# Patient Record
Sex: MALE | Race: WHITE
[De-identification: names, ages, dates, MRNs, and addresses within clinical notes are randomized per-mention and may not be internally consistent; named-entity substitution may affect disease eponyms.]

---

## 2020-02-02 ENCOUNTER — TRANSCRIPTION ENCOUNTER (OUTPATIENT)
Age: 60
End: 2020-02-02

## 2021-10-03 ENCOUNTER — TRANSCRIPTION ENCOUNTER (OUTPATIENT)
Age: 61
End: 2021-10-03

## 2021-10-06 ENCOUNTER — TRANSCRIPTION ENCOUNTER (OUTPATIENT)
Age: 61
End: 2021-10-06

## 2021-12-07 ENCOUNTER — TRANSCRIPTION ENCOUNTER (OUTPATIENT)
Age: 61
End: 2021-12-07

## 2022-02-11 ENCOUNTER — TRANSCRIPTION ENCOUNTER (OUTPATIENT)
Age: 62
End: 2022-02-11

## 2022-03-26 ENCOUNTER — TRANSCRIPTION ENCOUNTER (OUTPATIENT)
Age: 62
End: 2022-03-26

## 2022-04-12 PROBLEM — Z00.00 ENCOUNTER FOR PREVENTIVE HEALTH EXAMINATION: Status: ACTIVE | Noted: 2022-04-12

## 2022-04-13 ENCOUNTER — APPOINTMENT (OUTPATIENT)
Dept: PAIN MANAGEMENT | Facility: CLINIC | Age: 62
End: 2022-04-13
Payer: COMMERCIAL

## 2022-04-13 VITALS — BODY MASS INDEX: 29.18 KG/M2 | WEIGHT: 197 LBS | HEIGHT: 69 IN

## 2022-04-13 PROCEDURE — 99213 OFFICE O/P EST LOW 20 MIN: CPT

## 2022-04-13 NOTE — HISTORY OF PRESENT ILLNESS
[Lower back] : lower back [4] : 4 [6] : 6 [Tightness] : tightness [Tingling] : tingling [] : yes [Intermittent] : intermittent [Walking/activity] : walking/activity [Injection therapy] : injection therapy [FreeTextEntry1] : 4/13/22- patient had 70% relief.  Now has some tingling in left foot and left thigh cramping.  Will start gabapentin\par \par 3/9/22- Patient here for follow up. He had 90% relief for 1 week. Will schedule repeat injection.\par 1/12/22: Patient presents for initial evaluation. He complains of left sided back and posterior leg pain that started about\par 6 months ago. He has remained very active. no n/t. +weakness. He does a lot of exercises at home. The pain is now\par waking him up at night. Tried the Medrol dosepack with relief while on the pack. currently taking Piroxicam PRN which\par does help.\par Subjective weakness: Yes\par Lower extremity paresthesias:No\par Bladder/bowel dysfunction:No\par Attempted modalities for current complaint:\par See above:\par Medications: Yes\par Injections:No\par Previous Spine Surgery: N/A\par Imaging:\par MRI Lumbar Spine (12/30/21) - Impression:\par 1. L4-5: Disc bulge indents the thecal sac. The bulge causes mild bilateral neuroforaminal stenosis, abutting the exiting\par left L4 nerve.\par 2. L5-S1: 1 cm grade 1-2 anterolisthesis of L5 on S1 with bilateral pars spondylolysis defects. Severe disc space\par narrowing and chronic vertebral endplate changes with mild impaction of the vertebral bodies. Disc bulge with\par uncovering of the posterior disc. No central canal stenosis. Moderate-to-severe bilateral neuroforaminal stenosis\par impinging the exiting L5 nerves. Facet arthropathy. [FreeTextEntry6] : Cramping [FreeTextEntry7] : Left foot  [de-identified] : Getting up in the morning, prolonged sitting

## 2022-04-13 NOTE — ASSESSMENT
[FreeTextEntry1] : The patient is stable on current pain medication with analgesia and without notable side effects or any obvious aberrant behaviors exhibited.  \par There is clinically meaningful improvement in pain and function that outweighs risks to patient safety.  I have discussed risks and realistic benefits of opioid therapy and patient and clinician responsibilities for managing therapy.  Pain agreement has been signed.\par Will renew medication today.\par \par Will start gabapentin\par \par

## 2022-04-13 NOTE — PHYSICAL EXAM
[Normal Coordination] : normal coordination [Normal Mood and Affect] : normal mood and affect [5___] : right hamstring 5[unfilled]/5 [] : positive straight leg raise

## 2022-04-17 ENCOUNTER — TRANSCRIPTION ENCOUNTER (OUTPATIENT)
Age: 62
End: 2022-04-17

## 2022-05-23 ENCOUNTER — NON-APPOINTMENT (OUTPATIENT)
Age: 62
End: 2022-05-23

## 2022-07-05 ENCOUNTER — APPOINTMENT (OUTPATIENT)
Dept: ORTHOPEDIC SURGERY | Facility: CLINIC | Age: 62
End: 2022-07-05

## 2022-07-05 PROCEDURE — J3490M: CUSTOM

## 2022-07-05 PROCEDURE — 99213 OFFICE O/P EST LOW 20 MIN: CPT | Mod: 25

## 2022-07-05 PROCEDURE — 20610 DRAIN/INJ JOINT/BURSA W/O US: CPT

## 2022-07-05 NOTE — HISTORY OF PRESENT ILLNESS
[Right Arm] : right arm [Gradual] : gradual [Sudden] : sudden [7] : 7 [6] : 6 [Burning] : burning [Shooting] : shooting [Stabbing] : stabbing [Intermittent] : intermittent [Exercising] : exercising [de-identified] : 07/05/22:  Patient returns today about 5 1/2 months after right shoulder cortisone injection. Had good relief until 1 week ago, c/o recurring rt shoulder pain. having wake up pain at night. Can't lie on rt side at night. Would like a repeat injection.\par \par  01/21/22: Return visit for a 61 year old male here today for right shoulder pain that began a few months ago. No injury. Lifting overhead is painful and laying on his arm. Had done a MDP recently for his back and Feldene without relief. \par \par PMH: No previous right shoulder pain. [] : Post Surgical Visit: no [FreeTextEntry1] : right shoulder

## 2022-07-05 NOTE — PROCEDURE
[Large Joint Injection] : Large joint injection [Right] : of the right [Subacromial Space] : subacromial space [Pain] : pain [Inflammation] : inflammation [Ethyl Chloride sprayed topically] : ethyl chloride sprayed topically [___ cc    1%] : Lidocaine ~Vcc of 1%  [___ cc    40mg] : Methylprednisolone (Depomedrol) ~Vcc of 40 mg  [] : Patient tolerated procedure well [Call if redness, pain or fever occur] : call if redness, pain or fever occur [Apply ice for 15min out of every hour for the next 12-24 hours as tolerated] : apply ice for 15 minutes out of every hour for the next 12-24 hours as tolerated [Risks, benefits, alternatives discussed / Verbal consent obtained] : the risks benefits, and alternatives have been discussed, and verbal consent was obtained

## 2022-07-05 NOTE — PHYSICAL EXAM
[Normal Mood and Affect] : normal mood and affect [Orientated] : orientated [Able to Communicate] : able to communicate [Well Developed] : well developed [Well Nourished] : well nourished [Right] : right shoulder [NL (0-180)] : full passive forward flexion 0-180 degrees [] : no AC joint tenderness [FreeTextEntry9] : IR right gluteal on the right and T10 on the left.  [de-identified] : +Haylie [de-identified] : external rotation 80 degrees

## 2022-07-14 ENCOUNTER — FORM ENCOUNTER (OUTPATIENT)
Age: 62
End: 2022-07-14

## 2022-07-15 ENCOUNTER — APPOINTMENT (OUTPATIENT)
Dept: MRI IMAGING | Facility: CLINIC | Age: 62
End: 2022-07-15

## 2022-07-15 PROCEDURE — 73221 MRI JOINT UPR EXTREM W/O DYE: CPT | Mod: RT

## 2022-07-29 ENCOUNTER — APPOINTMENT (OUTPATIENT)
Dept: ORTHOPEDIC SURGERY | Facility: CLINIC | Age: 62
End: 2022-07-29

## 2022-07-29 PROCEDURE — 20610 DRAIN/INJ JOINT/BURSA W/O US: CPT

## 2022-07-29 PROCEDURE — J3490M: CUSTOM

## 2022-07-29 PROCEDURE — 99213 OFFICE O/P EST LOW 20 MIN: CPT | Mod: 25

## 2022-07-29 NOTE — PROCEDURE
[Large Joint Injection] : Large joint injection [Right] : of the right [Subacromial Space] : subacromial space [Pain] : pain [Inflammation] : inflammation [Betadine] : betadine [Ethyl Chloride sprayed topically] : ethyl chloride sprayed topically [___ cc    1%] : Lidocaine ~Vcc of 1%  [___ cc    40mg] : Methylprednisolone (Depomedrol) ~Vcc of 40 mg  [] : Patient tolerated procedure well [Call if redness, pain or fever occur] : call if redness, pain or fever occur [Apply ice for 15min out of every hour for the next 12-24 hours as tolerated] : apply ice for 15 minutes out of every hour for the next 12-24 hours as tolerated [Risks, benefits, alternatives discussed / Verbal consent obtained] : the risks benefits, and alternatives have been discussed, and verbal consent was obtained

## 2022-07-29 NOTE — HISTORY OF PRESENT ILLNESS
[Right Arm] : right arm [Gradual] : gradual [Sudden] : sudden [4] : 4 [3] : 3 [Burning] : burning [Shooting] : shooting [Stabbing] : stabbing [Intermittent] : intermittent [Rest] : rest [Exercising] : exercising [de-identified] : 07/29/22:  Here today for right shoulder MRI results.  Is about three weeks after cortisone injection and says he has about 75% relief. \par \par Impression: \par 1. AC joint arthrosis.\par 2. 12 mm AP dimension x 3 mm transverse dimension articular tear of the supraspinatus proximal to the  insertion with 2 and 3-mm subchondral cysts in the humeral head and no fracture.\par 3. Capsular thickening, anterior which can be seen with adhesive capsulitis.\par 4. Fraying and tear of the superior labrum and anterior inferior labrum. 3-mm septated posterior superior labral cyst.\par _____________________________________________\par \par 07/05/22:  Patient returns today about 5 1/2 months after right shoulder cortisone injection. Had good relief until 1 week ago, c/o recurring rt shoulder pain. having wake up pain at night. Can't lie on rt side at night. Would like a repeat injection.\par \par  01/21/22: Return visit for a 61 year old male here today for right shoulder pain that began a few months ago. No injury. Lifting overhead is painful and laying on his arm. Had done a MDP recently for his back and Feldene without relief. \par \par PMH: No previous right shoulder pain. [] : Post Surgical Visit: no [FreeTextEntry1] : right shoulder [de-identified] : xrays mris [de-identified] : none [de-identified] : 7/5/22 [TWNoteComboBox1] : 70%

## 2022-07-29 NOTE — PHYSICAL EXAM
[Normal Mood and Affect] : normal mood and affect [Orientated] : orientated [Able to Communicate] : able to communicate [Well Developed] : well developed [Well Nourished] : well nourished [Right] : right shoulder [NL (0-180)] : full passive forward flexion 0-180 degrees [] : no AC joint tenderness [de-identified] : +Haylie [FreeTextEntry9] : IR right gluteal on the right and T10 on the left.  [de-identified] : external rotation 80 degrees

## 2022-08-30 ENCOUNTER — APPOINTMENT (OUTPATIENT)
Dept: ORTHOPEDIC SURGERY | Facility: CLINIC | Age: 62
End: 2022-08-30

## 2022-08-30 PROCEDURE — 20551 NJX 1 TENDON ORIGIN/INSJ: CPT | Mod: LT

## 2022-08-30 PROCEDURE — 99214 OFFICE O/P EST MOD 30 MIN: CPT | Mod: 25

## 2022-08-30 PROCEDURE — 73080 X-RAY EXAM OF ELBOW: CPT | Mod: LT

## 2022-08-30 NOTE — PROCEDURE
[Tendon Origin] : tendon origin [Left] : of the left [Lateral Epicondyle] : lateral epicondyle [Pain] : pain [Inflammation] : inflammation [Betadine] : betadine [Ethyl Chloride sprayed topically] : ethyl chloride sprayed topically [___ cc    1%] : Lidocaine ~Vcc of 1%  [___ cc    40mg] : Methylprednisolone (Depomedrol) ~Vcc of 40 mg  [] : Patient tolerated procedure well [Call if redness, pain or fever occur] : call if redness, pain or fever occur [Apply ice for 15min out of every hour for the next 12-24 hours as tolerated] : apply ice for 15 minutes out of every hour for the next 12-24 hours as tolerated [Risks, benefits, alternatives discussed / Verbal consent obtained] : the risks benefits, and alternatives have been discussed, and verbal consent was obtained

## 2022-08-30 NOTE — HISTORY OF PRESENT ILLNESS
[Left Arm] : left arm [Gradual] : gradual [Sudden] : sudden [6] : 6 [5] : 5 [Burning] : burning [Shooting] : shooting [Stabbing] : stabbing [Rest] : rest [Exercising] : exercising [Lying in bed] : lying in bed [de-identified] : 08/30/22:  Returns today with complaint of lateral left elbow pain that began about two months ago.  No injury.\par \par PMH:  No previous left elbow pain.  [] : Post Surgical Visit: no [FreeTextEntry1] : left elbow  [FreeTextEntry7] : up into the shoulder  [de-identified] : xrays mris  [de-identified] : None

## 2022-08-30 NOTE — PHYSICAL EXAM
Writer spoke with Mobile Team who stated they will maintain emergency assisted [facility] d/t pt remaining a \"high risk\". MD aware.      [Normal Mood and Affect] : normal mood and affect [Orientated] : orientated [Able to Communicate] : able to communicate [Well Developed] : well developed [Well Nourished] : well nourished [NL (150)] : flexion 150 degrees [NL (0)] : extension 0 degrees [NL (90)] : supination 90 degrees [5___] : supination 5[unfilled]/5 [Left] : left elbow [There are no fractures, subluxations or dislocations. No significant abnormalities are seen] : There are no fractures, subluxations or dislocations. No significant abnormalities are seen [FreeTextEntry9] : IR right gluteal on the right and T10 on the left.  [de-identified] : +Haylie [de-identified] : external rotation 80 degrees [] : no tenderness over medial epicondyle

## 2022-11-04 ENCOUNTER — APPOINTMENT (OUTPATIENT)
Dept: ORTHOPEDIC SURGERY | Facility: CLINIC | Age: 62
End: 2022-11-04

## 2022-11-04 PROCEDURE — 99213 OFFICE O/P EST LOW 20 MIN: CPT | Mod: 25

## 2022-11-04 PROCEDURE — 73030 X-RAY EXAM OF SHOULDER: CPT | Mod: LT

## 2022-11-04 PROCEDURE — 20610 DRAIN/INJ JOINT/BURSA W/O US: CPT | Mod: LT

## 2022-11-04 NOTE — PHYSICAL EXAM
[Normal Mood and Affect] : normal mood and affect [Able to Communicate] : able to communicate [Well Developed] : well developed [Well Nourished] : well nourished [Left] : left shoulder [NL (0-180)] : full passive forward flexion 0-180 degrees [NL (0-90)] : full external rotation 0-90 degrees [There are no fractures, subluxations or dislocations. No significant abnormalities are seen] : There are no fractures, subluxations or dislocations. No significant abnormalities are seen [Type 3 acromion] : Type 3 acromion [de-identified] : average [] : negative Mcpherson [FreeTextEntry9] : IR to T7 [de-identified] : - Hyalie

## 2022-11-04 NOTE — PROCEDURE
[Large Joint Injection] : Large joint injection [Left] : of the left [Subacromial Space] : subacromial space [Pain] : pain [Inflammation] : inflammation [Betadine] : betadine [Ethyl Chloride sprayed topically] : ethyl chloride sprayed topically [___ cc    1%] : Lidocaine ~Vcc of 1%  [___ cc    40mg] : Methylprednisolone (Depomedrol) ~Vcc of 40 mg  [] : Patient tolerated procedure well [Call if redness, pain or fever occur] : call if redness, pain or fever occur [Apply ice for 15min out of every hour for the next 12-24 hours as tolerated] : apply ice for 15 minutes out of every hour for the next 12-24 hours as tolerated [Risks, benefits, alternatives discussed / Verbal consent obtained] : the risks benefits, and alternatives have been discussed, and verbal consent was obtained

## 2022-11-04 NOTE — HISTORY OF PRESENT ILLNESS
[Left Arm] : left arm [Gradual] : gradual [Sudden] : sudden [6] : 6 [5] : 5 [Burning] : burning [Shooting] : shooting [Stabbing] : stabbing [Intermittent] : intermittent [Rest] : rest [Exercising] : exercising [de-identified] : 11/4/22  Return visit for this 62 year male RHD c/o spon. onset of lt shoulder pain x last 3 weeks duration. No hx of trauma. Constant and daily. Worse w/ overhead activity. Can't lie on lt side at night. has wake up pain at night. tried advil 400mg qhs prn w/o relief.\par PMH: NO prior lt shoulder issues. [] : Post Surgical Visit: no [FreeTextEntry1] : left shoulder  [FreeTextEntry5] : Pt has a hx of a torn rotator cuff and OA in his right shoulder and pt has been having the same type of pain in his right shoulder  [de-identified] : none

## 2022-11-22 ENCOUNTER — APPOINTMENT (OUTPATIENT)
Dept: ORTHOPEDIC SURGERY | Facility: CLINIC | Age: 62
End: 2022-11-22

## 2022-11-22 VITALS — BODY MASS INDEX: 28.88 KG/M2 | HEIGHT: 69 IN | WEIGHT: 195 LBS

## 2022-11-22 DIAGNOSIS — Z78.9 OTHER SPECIFIED HEALTH STATUS: ICD-10-CM

## 2022-11-22 DIAGNOSIS — M75.52 BURSITIS OF LEFT SHOULDER: ICD-10-CM

## 2022-11-22 PROCEDURE — 99213 OFFICE O/P EST LOW 20 MIN: CPT | Mod: 25

## 2022-11-22 PROCEDURE — 20610 DRAIN/INJ JOINT/BURSA W/O US: CPT | Mod: LT

## 2022-11-22 NOTE — PROCEDURE
[Large Joint Injection] : Large joint injection [Left] : of the left [Subacromial Space] : subacromial space [Betadine] : betadine [Ethyl Chloride sprayed topically] : ethyl chloride sprayed topically [___ cc    1%] : Lidocaine ~Vcc of 1%  [___ cc    40mg] : Methylprednisolone (Depomedrol) ~Vcc of 40 mg  [] : Patient tolerated procedure well [Call if redness, pain or fever occur] : call if redness, pain or fever occur [Apply ice for 15min out of every hour for the next 12-24 hours as tolerated] : apply ice for 15 minutes out of every hour for the next 12-24 hours as tolerated [Risks, benefits, alternatives discussed / Verbal consent obtained] : the risks benefits, and alternatives have been discussed, and verbal consent was obtained

## 2022-11-22 NOTE — PHYSICAL EXAM
[Normal Mood and Affect] : normal mood and affect [Able to Communicate] : able to communicate [Well Developed] : well developed [Well Nourished] : well nourished [Left] : left shoulder [NL (0-180)] : full passive forward flexion 0-180 degrees [NL (0-90)] : full external rotation 0-90 degrees [There are no fractures, subluxations or dislocations. No significant abnormalities are seen] : There are no fractures, subluxations or dislocations. No significant abnormalities are seen [Type 3 acromion] : Type 3 acromion [de-identified] : average [] : negative Mcpherson [FreeTextEntry9] : IR to T7 [de-identified] : - Haylie

## 2022-11-22 NOTE — HISTORY OF PRESENT ILLNESS
[7] : 7 [Sharp] : sharp [Shooting] : shooting [Constant] : constant [Leisure] : leisure [Sleep] : sleep [Ice] : ice [Injection therapy] : injection therapy [Full time] : Work status: full time [1] : 1 [Other:____] : [unfilled] [de-identified] : 11/22/22:  Patient returns today about 2+ weeks after left shoulder cortisone injection. Was doing well until 1 week ago his pain returned. Would like to repeat the injection.\par \par 11/4/22  Return visit for this 62 year male RHD c/o spon. onset of lt shoulder pain x last 3 weeks duration. No hx of trauma. Constant and daily. Worse w/ overhead activity. Can't lie on lt side at night. has wake up pain at night. tried advil 400mg qhs prn w/o relief.\par PMH: NO prior lt shoulder issues. [] : Post Surgical Visit: no [FreeTextEntry5] : Pt states the last injection lasted a week and the pain started to come back.  [de-identified] : raising arm above head [de-identified] : xrays, mris [de-identified] :   [de-identified] : 11/04/2022 [de-identified] : left shoulder [de-identified] : cortisone [TWNoteComboBox1] : 90%

## 2022-12-28 ENCOUNTER — APPOINTMENT (OUTPATIENT)
Dept: PAIN MANAGEMENT | Facility: CLINIC | Age: 62
End: 2022-12-28
Payer: COMMERCIAL

## 2023-01-06 ENCOUNTER — APPOINTMENT (OUTPATIENT)
Dept: ORTHOPEDIC SURGERY | Facility: CLINIC | Age: 63
End: 2023-01-06
Payer: COMMERCIAL

## 2023-01-06 VITALS — WEIGHT: 195 LBS | BODY MASS INDEX: 28.88 KG/M2 | HEIGHT: 69 IN

## 2023-01-06 DIAGNOSIS — I10 ESSENTIAL (PRIMARY) HYPERTENSION: ICD-10-CM

## 2023-01-06 PROCEDURE — 20610 DRAIN/INJ JOINT/BURSA W/O US: CPT | Mod: RT

## 2023-01-06 PROCEDURE — 99213 OFFICE O/P EST LOW 20 MIN: CPT | Mod: 25

## 2023-01-06 NOTE — PHYSICAL EXAM
[Normal Mood and Affect] : normal mood and affect [Orientated] : orientated [Able to Communicate] : able to communicate [Well Developed] : well developed [Well Nourished] : well nourished [Right] : right shoulder [NL (0-180)] : full passive forward flexion 0-180 degrees [] : no AC joint tenderness [FreeTextEntry9] : T10 on the left.  [de-identified] : +Haylie [TWNoteComboBox6] : internal rotation L1 [de-identified] : external rotation 80 degrees

## 2023-01-06 NOTE — HISTORY OF PRESENT ILLNESS
[7] : 7 [8] : 8 [Sharp] : sharp [Full time] : Work status: full time [de-identified] : 01/06/23:  Returns today 5+ months after cortisone injection right shoulder with complaint of recurrent pain that began about two weeks ago.  He is having wake up pain and cannot lay on his arm.  Taking Advil 200 mg for pain, and using ice.  \par \par 07/29/22:  Here today for right shoulder MRI results.  Is about three weeks after cortisone injection and says he has about 75% relief. \par \par Impression: \par 1. AC joint arthrosis.\par 2. 12 mm AP dimension x 3 mm transverse dimension articular tear of the supraspinatus proximal to the  insertion with 2 and 3-mm subchondral cysts in the humeral head and no fracture.\par 3. Capsular thickening, anterior which can be seen with adhesive capsulitis.\par 4. Fraying and tear of the superior labrum and anterior inferior labrum. 3-mm septated posterior superior labral cyst.\par _____________________________________________\par \par 07/05/22:  Patient returns today about 5 1/2 months after right shoulder cortisone injection. Had good relief until 1 week ago, c/o recurring rt shoulder pain. having wake up pain at night. Can't lie on rt side at night. Would like a repeat injection.\par \par  01/21/22: Return visit for a 61 year old male here today for right shoulder pain that began a few months ago. No injury. Lifting overhead is painful and laying on his arm. Had done a MDP recently for his back and Feldene without relief. \par \par PMH: No previous right shoulder pain. [] : no [FreeTextEntry1] : right shoulder [de-identified] : dr Ervin [de-identified] : none [de-identified] :

## 2023-01-11 ENCOUNTER — APPOINTMENT (OUTPATIENT)
Dept: PAIN MANAGEMENT | Facility: CLINIC | Age: 63
End: 2023-01-11
Payer: COMMERCIAL

## 2023-01-11 VITALS — HEIGHT: 69 IN | BODY MASS INDEX: 28.88 KG/M2 | WEIGHT: 195 LBS

## 2023-01-11 PROCEDURE — 99214 OFFICE O/P EST MOD 30 MIN: CPT

## 2023-01-11 NOTE — ASSESSMENT
[FreeTextEntry1] : The patient is stable on current pain medication with analgesia and without notable side effects or any obvious aberrant behaviors exhibited.  \par There is clinically meaningful improvement in pain and function that outweighs risks to patient safety.  I have discussed risks and realistic benefits of opioid therapy and patient and clinician responsibilities for managing therapy.  Pain agreement has been signed.\par Will renew medication today.\par \par Will start gabapentin\par \par Left L5-S1 S1 TFESI\par \par

## 2023-01-11 NOTE — HISTORY OF PRESENT ILLNESS
[Lower back] : lower back [4] : 4 [6] : 6 [Tightness] : tightness [Tingling] : tingling [Intermittent] : intermittent [Walking/activity] : walking/activity [Injection therapy] : injection therapy [de-identified] : pt is following up , he would like to schedule an epidural \par following up for lower back pain lower left side  [] : no [FreeTextEntry6] : Cramping [FreeTextEntry7] : Left side  [de-identified] : Getting up in the morning, prolonged sitting  [FreeTextEntry1] : 1/11/23- Has pain returning in low back and radiates down left leg.  No N/T.  Will repeat TFESI\par \par 4/13/22- patient had 70% relief.  Now has some tingling in left foot and left thigh cramping.  Will start gabapentin\par \par 3/9/22- Patient here for follow up. He had 90% relief for 1 week. Will schedule repeat injection.\par 1/12/22: Patient presents for initial evaluation. He complains of left sided back and posterior leg pain that started about\par 6 months ago. He has remained very active. no n/t. +weakness. He does a lot of exercises at home. The pain is now\par waking him up at night. Tried the Medrol dosepack with relief while on the pack. currently taking Piroxicam PRN which\par does help.\par Subjective weakness: Yes\par Lower extremity paresthesias:No\par Bladder/bowel dysfunction:No\par Attempted modalities for current complaint:\par See above:\par Medications: Yes\par Injections:No\par Previous Spine Surgery: N/A\par Imaging:\par MRI Lumbar Spine (12/30/21) - Impression:\par 1. L4-5: Disc bulge indents the thecal sac. The bulge causes mild bilateral neuroforaminal stenosis, abutting the exiting\par left L4 nerve.\par 2. L5-S1: 1 cm grade 1-2 anterolisthesis of L5 on S1 with bilateral pars spondylolysis defects. Severe disc space\par narrowing and chronic vertebral endplate changes with mild impaction of the vertebral bodies. Disc bulge with\par uncovering of the posterior disc. No central canal stenosis. Moderate-to-severe bilateral neuroforaminal stenosis\par impinging the exiting L5 nerves. Facet arthropathy.

## 2023-01-31 ENCOUNTER — APPOINTMENT (OUTPATIENT)
Dept: PAIN MANAGEMENT | Facility: CLINIC | Age: 63
End: 2023-01-31
Payer: COMMERCIAL

## 2023-01-31 PROCEDURE — 64483 NJX AA&/STRD TFRM EPI L/S 1: CPT | Mod: LT

## 2023-01-31 PROCEDURE — A4550 SURGICAL TRAYS: CPT

## 2023-01-31 PROCEDURE — 64484 NJX AA&/STRD TFRM EPI L/S EA: CPT | Mod: 59,LT

## 2023-01-31 NOTE — PROCEDURE
[FreeTextEntry3] : Date of Service: 01/31/2023 \par \par Account: [id number]\par \par Patient: MEHRAN ROJAS \par \par YOB: 1960\par \par Age: 62 year\par \par \par Surgeon: Esther Gonzalez M.D.\par \par Assistant: None.\par \par Pre-Operative Diagnosis: Lumbosacral Radiculitis (M54.17)\par \par Post Operative Diagnosis: Lumbosacral Radiculitis (M54.17)\par \par Procedure: Left L5-S1, S1 transforaminal epidural steroid injection under fluoroscopic guidance.  \par \par Anesthesia:      MAC\par \par \par This procedure was carried out using fluoroscopic guidance.  The risks and benefits of the procedure were discussed extensively with the patient.  The consent of the patient was obtained and the following procedure was performed. The patient was placed in the prone position on the fluoroscopic table and the lumbar area was prepped and draped in a sterile fashion.\par \par The left L5-S1 neural foramen was then identified on left oblique  "twyla dog" anatomical view at the 6 o' clock position using fluoroscopic guidance, and the area was marked. The overlying skin and subcutaneous structures were anesthetized using sterile technique with 1% Lidocaine.  A 22 gauge spinal needle was directed toward the inferior (6 o'clock) position of the pedicle, which formed the roof of the identified foramen.  Once in the epidural space, after negative aspiration for heme and CSF, 1cc of Omnipaque contrast was injected to confirm epidural location and assess filling defects and rule out intravascular needle placement. \par \par The following contrast flow and epidurogram was observed: no intravascular or intrathecal flow pattern was noted.  No blood or CSF was aspirated. Omnipaque spread appeared to outline the left L5 nerve root and spread medially into the epidural space.  \par \par After this, an injectate of 3 cc preservative free normal saline plus 40 mg of Kenalog was injected in the epidural space.\par \par The  left S1 neural foramen was then identified on left oblique anatomical view at the 6 o' clock position of the S1 pedicle using fluoroscopic guidance, and the area was marked. The overlying skin and subcutaneous structures were anesthetized using sterile technique with 1% Lidocaine.  A 22 gauge spinal needle was directed toward the inferior (6 o'clock) position of the pedicle, which formed the roof of the identified foramen.  Once in the epidural space, after negative aspiration for heme and CSF, 1cc of Omnipaque contrast was injected to confirm epidural location and assess filling defects and rule out intravascular needle placement. \par \par The following contrast flow and epidurogram was observed: no intravascular or intrathecal flow pattern was noted.  No blood or CSF was aspirated. Omnipaque spread appeared to outline the left S1 nerve root and spread medially into the epidural space.  \par \par After this, an injectate of 3 cc preservative free normal saline plus 40 mg of Kenalog was injected in the epidural space.\par \par The needle was subsequently removed.  Vital signs remained normal.  Pulse oximeter was used throughout the procedure and the patient's pulse and oxygen saturation remained within normal limits.  The patient tolerated the procedure well.  There were no complications.  The patient was instructed to apply ice over the injection sites for twenty minutes every two hours for the next 24 to 48 hours.  The patient was also instructed to contact me immediately if there were any problems.\par \par Esther Gonzalez M.D.\par

## 2023-02-15 ENCOUNTER — APPOINTMENT (OUTPATIENT)
Dept: PAIN MANAGEMENT | Facility: CLINIC | Age: 63
End: 2023-02-15

## 2023-03-31 ENCOUNTER — APPOINTMENT (OUTPATIENT)
Dept: ORTHOPEDIC SURGERY | Facility: CLINIC | Age: 63
End: 2023-03-31
Payer: COMMERCIAL

## 2023-03-31 VITALS — HEIGHT: 69 IN | WEIGHT: 195 LBS | BODY MASS INDEX: 28.88 KG/M2

## 2023-03-31 DIAGNOSIS — M25.811 OTHER SPECIFIED JOINT DISORDERS, RIGHT SHOULDER: ICD-10-CM

## 2023-03-31 PROCEDURE — 99213 OFFICE O/P EST LOW 20 MIN: CPT | Mod: 25

## 2023-03-31 PROCEDURE — 20610 DRAIN/INJ JOINT/BURSA W/O US: CPT | Mod: RT

## 2023-03-31 NOTE — HISTORY OF PRESENT ILLNESS
[7] : 7 [8] : 8 [Sharp] : sharp [Full time] : Work status: full time [de-identified] : 03/31/23:  Here today almost 3 months after right shoulder cortisone injection. C/o recurring rt shoulder pain x last 2 weeks duration. Can't lie on rt side at night. has occ. wake up pain at night. Tried ice and advil w/o relief.\par \par 01/06/23:  Returns today 5+ months after cortisone injection right shoulder with complaint of recurrent pain that began about two weeks ago.  He is having wake up pain and cannot lay on his arm.  Taking Advil 200 mg for pain, and using ice.  \par \par 07/29/22:  Here today for right shoulder MRI results.  Is about three weeks after cortisone injection and says he has about 75% relief. \par \par Impression: \par 1. AC joint arthrosis.\par 2. 12 mm AP dimension x 3 mm transverse dimension articular tear of the supraspinatus proximal to the  insertion with 2 and 3-mm subchondral cysts in the humeral head and no fracture.\par 3. Capsular thickening, anterior which can be seen with adhesive capsulitis.\par 4. Fraying and tear of the superior labrum and anterior inferior labrum. 3-mm septated posterior superior labral cyst.\par _____________________________________________\par \par 07/05/22:  Patient returns today about 5 1/2 months after right shoulder cortisone injection. Had good relief until 1 week ago, c/o recurring rt shoulder pain. having wake up pain at night. Can't lie on rt side at night. Would like a repeat injection.\par \par  01/21/22: Return visit for a 61 year old male here today for right shoulder pain that began a few months ago. No injury. Lifting overhead is painful and laying on his arm. Had done a MDP recently for his back and Feldene without relief. \par \par PMH: No previous right shoulder pain. 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

## 2023-03-31 NOTE — PHYSICAL EXAM
[Normal Mood and Affect] : normal mood and affect [Orientated] : orientated [Able to Communicate] : able to communicate [Well Developed] : well developed [Well Nourished] : well nourished [Right] : right shoulder [NL (0-180)] : full passive forward flexion 0-180 degrees [] : no AC joint tenderness [FreeTextEntry9] : T10 on the left.  [de-identified] : +Haylie [TWNoteComboBox6] : internal rotation L1 [de-identified] : external rotation 80 degrees

## 2023-05-03 DIAGNOSIS — Z87.19 PERSONAL HISTORY OF OTHER DISEASES OF THE DIGESTIVE SYSTEM: ICD-10-CM

## 2023-05-03 DIAGNOSIS — Z78.9 OTHER SPECIFIED HEALTH STATUS: ICD-10-CM

## 2023-05-03 DIAGNOSIS — Z83.3 FAMILY HISTORY OF DIABETES MELLITUS: ICD-10-CM

## 2023-05-03 DIAGNOSIS — Z86.79 PERSONAL HISTORY OF OTHER DISEASES OF THE CIRCULATORY SYSTEM: ICD-10-CM

## 2023-05-03 DIAGNOSIS — Z82.49 FAMILY HISTORY OF ISCHEMIC HEART DISEASE AND OTHER DISEASES OF THE CIRCULATORY SYSTEM: ICD-10-CM

## 2023-05-03 RX ORDER — ALLOPURINOL 200 MG/1
TABLET ORAL
Refills: 0 | Status: ACTIVE | COMMUNITY

## 2023-05-03 RX ORDER — LOSARTAN POTASSIUM 100 MG/1
TABLET, FILM COATED ORAL
Refills: 0 | Status: ACTIVE | COMMUNITY

## 2023-05-11 ENCOUNTER — APPOINTMENT (OUTPATIENT)
Dept: CARDIOLOGY | Facility: CLINIC | Age: 63
End: 2023-05-11
Payer: COMMERCIAL

## 2023-05-11 ENCOUNTER — NON-APPOINTMENT (OUTPATIENT)
Age: 63
End: 2023-05-11

## 2023-05-11 VITALS — DIASTOLIC BLOOD PRESSURE: 85 MMHG | OXYGEN SATURATION: 96 % | SYSTOLIC BLOOD PRESSURE: 120 MMHG | HEART RATE: 86 BPM

## 2023-05-11 DIAGNOSIS — R07.89 OTHER CHEST PAIN: ICD-10-CM

## 2023-05-11 PROCEDURE — 99204 OFFICE O/P NEW MOD 45 MIN: CPT

## 2023-05-11 PROCEDURE — 93000 ELECTROCARDIOGRAM COMPLETE: CPT

## 2023-05-11 RX ORDER — GABAPENTIN 300 MG/1
300 CAPSULE ORAL 3 TIMES DAILY
Qty: 90 | Refills: 0 | Status: DISCONTINUED | COMMUNITY
Start: 2022-04-13 | End: 2023-05-11

## 2023-05-23 ENCOUNTER — APPOINTMENT (OUTPATIENT)
Dept: PAIN MANAGEMENT | Facility: CLINIC | Age: 63
End: 2023-05-23
Payer: COMMERCIAL

## 2023-05-23 VITALS — WEIGHT: 196 LBS | HEIGHT: 69 IN | BODY MASS INDEX: 29.03 KG/M2

## 2023-05-23 PROCEDURE — 99214 OFFICE O/P EST MOD 30 MIN: CPT

## 2023-05-23 NOTE — HISTORY OF PRESENT ILLNESS
[Lower back] : lower back [4] : 4 [6] : 6 [Tightness] : tightness [Tingling] : tingling [Intermittent] : intermittent [Walking/activity] : walking/activity [Injection therapy] : injection therapy [FreeTextEntry1] : 5/23/23- fu for left L5-S1, S1 TFESI on 1/31.  Had 80% relief from injection in January.  Will repeat.  \par \par 1/11/23- Has pain returning in low back and radiates down left leg.  No N/T.  Will repeat TFESI\par \par 4/13/22- patient had 70% relief.  Now has some tingling in left foot and left thigh cramping.  Will start gabapentin\par \par 3/9/22- Patient here for follow up. He had 90% relief for 1 week. Will schedule repeat injection.\par 1/12/22: Patient presents for initial evaluation. He complains of left sided back and posterior leg pain that started about\par 6 months ago. He has remained very active. no n/t. +weakness. He does a lot of exercises at home. The pain is now\par waking him up at night. Tried the Medrol dosepack with relief while on the pack. currently taking Piroxicam PRN which\par does help.\par Subjective weakness: Yes\par Lower extremity paresthesias:No\par Bladder/bowel dysfunction:No\par Attempted modalities for current complaint:\par See above:\par Medications: Yes\par Injections:No\par Left L5-S1, S1 on 1/31/23\par Previous Spine Surgery: N/A\par Imaging:\par MRI Lumbar Spine (12/30/21) - Impression:\par 1. L4-5: Disc bulge indents the thecal sac. The bulge causes mild bilateral neuroforaminal stenosis, abutting the exiting\par left L4 nerve.\par 2. L5-S1: 1 cm grade 1-2 anterolisthesis of L5 on S1 with bilateral pars spondylolysis defects. Severe disc space\par narrowing and chronic vertebral endplate changes with mild impaction of the vertebral bodies. Disc bulge with\par uncovering of the posterior disc. No central canal stenosis. Moderate-to-severe bilateral neuroforaminal stenosis\par impinging the exiting L5 nerves. Facet arthropathy. [] : no [FreeTextEntry6] : Cramping [FreeTextEntry7] : Left side  [de-identified] : Getting up in the morning, prolonged sitting  [de-identified] : pt is following up , he would like to schedule an epidural \par following up for lower back pain lower left side

## 2023-05-26 ENCOUNTER — APPOINTMENT (OUTPATIENT)
Dept: ORTHOPEDIC SURGERY | Facility: CLINIC | Age: 63
End: 2023-05-26
Payer: COMMERCIAL

## 2023-05-26 ENCOUNTER — APPOINTMENT (OUTPATIENT)
Dept: CARDIOLOGY | Facility: CLINIC | Age: 63
End: 2023-05-26
Payer: COMMERCIAL

## 2023-05-26 PROCEDURE — 93306 TTE W/DOPPLER COMPLETE: CPT

## 2023-05-26 PROCEDURE — 93015 CV STRESS TEST SUPVJ I&R: CPT

## 2023-05-26 PROCEDURE — 99213 OFFICE O/P EST LOW 20 MIN: CPT | Mod: 25

## 2023-05-26 PROCEDURE — 20551 NJX 1 TENDON ORIGIN/INSJ: CPT | Mod: LT

## 2023-05-26 NOTE — PROCEDURE
[Tendon Origin] : tendon origin [Left] : of the left [Lateral Epicondyle] : lateral epicondyle [Pain] : pain [Inflammation] : inflammation [Alcohol] : alcohol [Ethyl Chloride sprayed topically] : ethyl chloride sprayed topically [___ cc    1%] : Lidocaine ~Vcc of 1%  [___ cc    40mg] : Methylprednisolone (Depomedrol) ~Vcc of 40 mg  [] : Patient tolerated procedure well [Call if redness, pain or fever occur] : call if redness, pain or fever occur [Apply ice for 15min out of every hour for the next 12-24 hours as tolerated] : apply ice for 15 minutes out of every hour for the next 12-24 hours as tolerated [Risks, benefits, alternatives discussed / Verbal consent obtained] : the risks benefits, and alternatives have been discussed, and verbal consent was obtained

## 2023-05-26 NOTE — PHYSICAL EXAM
[Normal Mood and Affect] : normal mood and affect [Orientated] : orientated [Able to Communicate] : able to communicate [Well Developed] : well developed [Well Nourished] : well nourished [Right] : right shoulder [NL (0-180)] : full passive forward flexion 0-180 degrees [NL (150)] : flexion 150 degrees [NL (0)] : extension 0 degrees [NL (90)] : supination 90 degrees [5___] : supination 5[unfilled]/5 [Left] : left elbow [There are no fractures, subluxations or dislocations. No significant abnormalities are seen] : There are no fractures, subluxations or dislocations. No significant abnormalities are seen [FreeTextEntry9] : T10 on the left.  [de-identified] : +Haylie [TWNoteComboBox6] : internal rotation L1 [de-identified] : external rotation 80 degrees [] : no tenderness over medial epicondyle

## 2023-05-26 NOTE — HISTORY OF PRESENT ILLNESS
[7] : 7 [8] : 8 [Sharp] : sharp [Full time] : Work status: full time [de-identified] : 5/26/23  Return visit for this RHD male c/o recurring lt elbow pain x last 2 weeks duration. Can't  or lift even light objects.\par \par 03/31/23:  Here today almost 3 months after right shoulder cortisone injection. C/o recurring rt shoulder pain x last 2 weeks duration. Can't lie on rt side at night. has occ. wake up pain at night. Tried ice and advil w/o relief.\par \par 01/06/23:  Returns today 5+ months after cortisone injection right shoulder with complaint of recurrent pain that began about two weeks ago.  He is having wake up pain and cannot lay on his arm.  Taking Advil 200 mg for pain, and using ice.  \par \par 07/29/22:  Here today for right shoulder MRI results.  Is about three weeks after cortisone injection and says he has about 75% relief. \par \par Impression: \par 1. AC joint arthrosis.\par 2. 12 mm AP dimension x 3 mm transverse dimension articular tear of the supraspinatus proximal to the  insertion with 2 and 3-mm subchondral cysts in the humeral head and no fracture.\par 3. Capsular thickening, anterior which can be seen with adhesive capsulitis.\par 4. Fraying and tear of the superior labrum and anterior inferior labrum. 3-mm septated posterior superior labral cyst.\par _____________________________________________\par \par 07/05/22:  Patient returns today about 5 1/2 months after right shoulder cortisone injection. Had good relief until 1 week ago, c/o recurring rt shoulder pain. having wake up pain at night. Can't lie on rt side at night. Would like a repeat injection.\par \par  01/21/22: Return visit for a 61 year old male here today for right shoulder pain that began a few months ago. No injury. Lifting overhead is painful and laying on his arm. Had done a MDP recently for his back and Feldene without relief. \par \par PMH: No previous right shoulder pain. 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

## 2023-06-14 ENCOUNTER — APPOINTMENT (OUTPATIENT)
Dept: PAIN MANAGEMENT | Facility: CLINIC | Age: 63
End: 2023-06-14
Payer: COMMERCIAL

## 2023-06-14 PROCEDURE — A4550 SURGICAL TRAYS: CPT

## 2023-06-14 PROCEDURE — 64484 NJX AA&/STRD TFRM EPI L/S EA: CPT | Mod: 59,LT

## 2023-06-14 PROCEDURE — 64483 NJX AA&/STRD TFRM EPI L/S 1: CPT | Mod: LT

## 2023-06-14 NOTE — PROCEDURE
[FreeTextEntry3] : Date of Service: 06/14/2023 \par \par Account: [id number]\par \par Patient: MEHRAN ROJAS \par \par YOB: 1960\par \par Age: 62 year\par \par \par Surgeon: Esther Gonzalez M.D.\par \par Assistant: None.\par \par Pre-Operative Diagnosis: Lumbosacral Radiculitis (M54.17)\par \par Post Operative Diagnosis: Lumbosacral Radiculitis (M54.17)\par \par Procedure: Left L5-S1, S1 transforaminal epidural steroid injection under fluoroscopic guidance.  \par \par Anesthesia:      MAC\par \par \par This procedure was carried out using fluoroscopic guidance.  The risks and benefits of the procedure were discussed extensively with the patient.  The consent of the patient was obtained and the following procedure was performed. The patient was placed in the prone position on the fluoroscopic table and the lumbar area was prepped and draped in a sterile fashion.\par \par The left L5-S1 neural foramen was then identified on left oblique  "twyla dog" anatomical view at the 6 o' clock position using fluoroscopic guidance, and the area was marked. The overlying skin and subcutaneous structures were anesthetized using sterile technique with 1% Lidocaine.  A 22 gauge spinal needle was directed toward the inferior (6 o'clock) position of the pedicle, which formed the roof of the identified foramen.  Once in the epidural space, after negative aspiration for heme and CSF, 1cc of Omnipaque contrast was injected to confirm epidural location and assess filling defects and rule out intravascular needle placement. \par \par The following contrast flow and epidurogram was observed: no intravascular or intrathecal flow pattern was noted.  No blood or CSF was aspirated. Omnipaque spread appeared to outline the left L5 nerve root and spread medially into the epidural space.  \par \par After this, an injectate of 3 cc preservative free normal saline plus 40 mg of Kenalog was injected in the epidural space.\par \par The  left S1 neural foramen was then identified on left oblique anatomical view at the 6 o' clock position of the S1 pedicle using fluoroscopic guidance, and the area was marked. The overlying skin and subcutaneous structures were anesthetized using sterile technique with 1% Lidocaine.  A 22 gauge spinal needle was directed toward the inferior (6 o'clock) position of the pedicle, which formed the roof of the identified foramen.  Once in the epidural space, after negative aspiration for heme and CSF, 1cc of Omnipaque contrast was injected to confirm epidural location and assess filling defects and rule out intravascular needle placement. \par \par The following contrast flow and epidurogram was observed: no intravascular or intrathecal flow pattern was noted.  No blood or CSF was aspirated. Omnipaque spread appeared to outline the left S1 nerve root and spread medially into the epidural space.  \par \par After this, an injectate of 3 cc preservative free normal saline plus 40 mg of Kenalog was injected in the epidural space.\par \par The needle was subsequently removed.  Vital signs remained normal.  Pulse oximeter was used throughout the procedure and the patient's pulse and oxygen saturation remained within normal limits.  The patient tolerated the procedure well.  There were no complications.  The patient was instructed to apply ice over the injection sites for twenty minutes every two hours for the next 24 to 48 hours.  The patient was also instructed to contact me immediately if there were any problems.\par \par Esther Gonzalez M.D.

## 2023-06-27 ENCOUNTER — APPOINTMENT (OUTPATIENT)
Dept: PAIN MANAGEMENT | Facility: CLINIC | Age: 63
End: 2023-06-27

## 2023-10-11 ENCOUNTER — APPOINTMENT (OUTPATIENT)
Dept: PAIN MANAGEMENT | Facility: CLINIC | Age: 63
End: 2023-10-11
Payer: COMMERCIAL

## 2023-10-11 VITALS — WEIGHT: 196 LBS | BODY MASS INDEX: 29.03 KG/M2 | HEIGHT: 69 IN

## 2023-10-11 DIAGNOSIS — M54.16 RADICULOPATHY, LUMBAR REGION: ICD-10-CM

## 2023-10-11 PROCEDURE — 99214 OFFICE O/P EST MOD 30 MIN: CPT

## 2023-10-26 ENCOUNTER — APPOINTMENT (OUTPATIENT)
Dept: PAIN MANAGEMENT | Facility: CLINIC | Age: 63
End: 2023-10-26
Payer: COMMERCIAL

## 2023-10-26 PROCEDURE — A4450: CPT

## 2023-10-26 PROCEDURE — 64484 NJX AA&/STRD TFRM EPI L/S EA: CPT | Mod: 59,LT

## 2023-10-26 PROCEDURE — 64483 NJX AA&/STRD TFRM EPI L/S 1: CPT | Mod: LT

## 2023-11-01 ENCOUNTER — APPOINTMENT (OUTPATIENT)
Dept: PAIN MANAGEMENT | Facility: CLINIC | Age: 63
End: 2023-11-01

## 2023-11-07 ENCOUNTER — APPOINTMENT (OUTPATIENT)
Dept: PAIN MANAGEMENT | Facility: CLINIC | Age: 63
End: 2023-11-07

## 2023-12-18 ENCOUNTER — APPOINTMENT (OUTPATIENT)
Dept: ORTHOPEDIC SURGERY | Facility: CLINIC | Age: 63
End: 2023-12-18
Payer: COMMERCIAL

## 2023-12-18 VITALS — WEIGHT: 192 LBS | HEIGHT: 69 IN | BODY MASS INDEX: 28.44 KG/M2

## 2023-12-18 DIAGNOSIS — M77.12 LATERAL EPICONDYLITIS, LEFT ELBOW: ICD-10-CM

## 2023-12-18 DIAGNOSIS — Z87.39 PERSONAL HISTORY OF OTHER DISEASES OF THE MUSCULOSKELETAL SYSTEM AND CONNECTIVE TISSUE: ICD-10-CM

## 2023-12-18 PROCEDURE — 20551 NJX 1 TENDON ORIGIN/INSJ: CPT | Mod: LT

## 2023-12-18 PROCEDURE — 99213 OFFICE O/P EST LOW 20 MIN: CPT | Mod: 25

## 2023-12-18 NOTE — HISTORY OF PRESENT ILLNESS
[8] : 8 [Sharp] : sharp [Shooting] : shooting [Constant] : constant [Ice] : ice [Full time] : Work status: full time [7] : 7 [de-identified] : 12/18/23  Return visit for this 63 year old male c/o recurring lt elbow pain x last 2 months duration. Got worse x 1 day ago after lifting something.  5/26/23  Return visit for this RHD male c/o recurring lt elbow pain x last 2 weeks duration. Can't  or lift even light objects.  03/31/23:  Here today almost 3 months after right shoulder cortisone injection. C/o recurring rt shoulder pain x last 2 weeks duration. Can't lie on rt side at night. has occ. wake up pain at night. Tried ice and advil w/o relief.  01/06/23:  Returns today 5+ months after cortisone injection right shoulder with complaint of recurrent pain that began about two weeks ago.  He is having wake up pain and cannot lay on his arm.  Taking Advil 200 mg for pain, and using ice.    07/29/22:  Here today for right shoulder MRI results.  Is about three weeks after cortisone injection and says he has about 75% relief.   Impression:  1. AC joint arthrosis. 2. 12 mm AP dimension x 3 mm transverse dimension articular tear of the supraspinatus proximal to the  insertion with 2 and 3-mm subchondral cysts in the humeral head and no fracture. 3. Capsular thickening, anterior which can be seen with adhesive capsulitis. 4. Fraying and tear of the superior labrum and anterior inferior labrum. 3-mm septated posterior superior labral cyst. _____________________________________________  07/05/22:  Patient returns today about 5 1/2 months after right shoulder cortisone injection. Had good relief until 1 week ago, c/o recurring rt shoulder pain. having wake up pain at night. Can't lie on rt side at night. Would like a repeat injection.   01/21/22: Return visit for a 61 year old male here today for right shoulder pain that began a few months ago. No injury. Lifting overhead is painful and laying on his arm. Had done a MDP recently for his back and Feldene without relief.   PMH: No previous right shoulder pain. 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

## 2023-12-18 NOTE — PHYSICAL EXAM
[Normal Mood and Affect] : normal mood and affect [Orientated] : orientated [Able to Communicate] : able to communicate [Well Developed] : well developed [Well Nourished] : well nourished [Right] : right shoulder [NL (0-180)] : full passive forward flexion 0-180 degrees [NL (150)] : flexion 150 degrees [NL (0)] : extension 0 degrees [NL (90)] : supination 90 degrees [5___] : supination 5[unfilled]/5 [Left] : left elbow [There are no fractures, subluxations or dislocations. No significant abnormalities are seen] : There are no fractures, subluxations or dislocations. No significant abnormalities are seen [FreeTextEntry9] : T10 on the left.  [de-identified] : +Haylie [TWNoteComboBox6] : internal rotation L1 [de-identified] : external rotation 80 degrees [] : no tenderness over medial epicondyle

## 2023-12-18 NOTE — PLAN
[TextEntry] : The patient was advised of the diagnosis. The natural history of the pathology was explained in full to the patient in layman's terms. All questions were answered. The risks and benefits of surgical and non-surgical treatment alternatives were explained in full to the patient.  Medication was injected into the above treated area. After verbal consent using sterile preparation and technique. The risks, benefits, and alternatives to cortisone injection were explained in full to the patient. Risks outlined include but are not limited to infection, sepsis, bleeding, scarring, skin discoloration, temporary increase in pain, syncopal episode, failure to resolve symptoms, allergic reaction, symptom recurrence, and elevation of blood sugar in diabetics. Patient understood the risks. All questions were answered. After discussion of options, patient requested an injection. Oral informed consent was obtained and sterile prep was done of the injection site. Sterile technique was utilized for the procedure including the preparation of the solutions used for the injection. Patient tolerated the procedure well. Advised to ice the injection site this evening. Prep with Betadine locally to site. Sterile technique used. Patient tolerated procedure well. Post Procedure Instructions: Patient was advised to call if redness, pain, or fever occur and apply ice for 15 min. out of every hour for the next 12-24 hours as tolerated.  The patient was instructed on the importance of ice and elevation of the extremity to decrease swelling and pain.

## 2024-03-16 ENCOUNTER — NON-APPOINTMENT (OUTPATIENT)
Age: 64
End: 2024-03-16

## 2024-03-17 ENCOUNTER — TRANSCRIPTION ENCOUNTER (OUTPATIENT)
Age: 64
End: 2024-03-17

## 2024-06-10 ENCOUNTER — APPOINTMENT (OUTPATIENT)
Dept: ORTHOPEDIC SURGERY | Facility: CLINIC | Age: 64
End: 2024-06-10
Payer: COMMERCIAL

## 2024-06-10 VITALS — BODY MASS INDEX: 28.44 KG/M2 | HEIGHT: 69 IN | WEIGHT: 192 LBS

## 2024-06-10 DIAGNOSIS — M75.111 INCOMPLETE ROTATOR CUFF TEAR OR RUPTURE OF RIGHT SHOULDER, NOT SPECIFIED AS TRAUMATIC: ICD-10-CM

## 2024-06-10 DIAGNOSIS — M75.51 BURSITIS OF RIGHT SHOULDER: ICD-10-CM

## 2024-06-10 PROCEDURE — 99213 OFFICE O/P EST LOW 20 MIN: CPT | Mod: 25

## 2024-06-10 PROCEDURE — A4550 SURGICAL TRAYS: CPT

## 2024-06-10 PROCEDURE — 73030 X-RAY EXAM OF SHOULDER: CPT | Mod: RT

## 2024-06-10 PROCEDURE — 20610 DRAIN/INJ JOINT/BURSA W/O US: CPT | Mod: RT

## 2024-06-10 NOTE — HISTORY OF PRESENT ILLNESS
[Sharp] : sharp [Full time] : Work status: full time [8] : 8 [Dull/Aching] : dull/aching [Constant] : constant [Lying in bed] : lying in bed [de-identified] : 06/10/24:  Returns today complaining of spontaneous onset of recurring rt shoulder pain x last 2 weeks duration. Having wake up pain at night. Tried advil and tylenol w/o relief. Last injection over 1 year ago which helped until now.,  5/26/23  Return visit for this RHD male c/o recurring lt elbow pain x last 2 weeks duration. Can't  or lift even light objects.  03/31/23:  Here today almost 3 months after right shoulder cortisone injection. C/o recurring rt shoulder pain x last 2 weeks duration. Can't lie on rt side at night. has occ. wake up pain at night. Tried ice and advil w/o relief.  01/06/23:  Returns today 5+ months after cortisone injection right shoulder with complaint of recurrent pain that began about two weeks ago.  He is having wake up pain and cannot lay on his arm.  Taking Advil 200 mg for pain, and using ice.    07/29/22:  Here today for right shoulder MRI results.  Is about three weeks after cortisone injection and says he has about 75% relief.   Impression:  1. AC joint arthrosis. 2. 12 mm AP dimension x 3 mm transverse dimension articular tear of the supraspinatus proximal to the  insertion with 2 and 3-mm subchondral cysts in the humeral head and no fracture. 3. Capsular thickening, anterior which can be seen with adhesive capsulitis. 4. Fraying and tear of the superior labrum and anterior inferior labrum. 3-mm septated posterior superior labral cyst. _____________________________________________  07/05/22:  Patient returns today about 5 1/2 months after right shoulder cortisone injection. Had good relief until 1 week ago, c/o recurring rt shoulder pain. having wake up pain at night. Can't lie on rt side at night. Would like a repeat injection.   01/21/22: Return visit for a 61 year old male here today for right shoulder pain that began a few months ago. No injury. Lifting overhead is painful and laying on his arm. Had done a MDP recently for his back and Feldene without relief.   PMH: No previous right shoulder pain. 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

## 2024-06-10 NOTE — PHYSICAL EXAM
[Normal Mood and Affect] : normal mood and affect [Oriented] : oriented [Able to Communicate] : able to communicate [Well Developed] : well developed [Well Nourished] : well nourished [NL (0-180)] : full passive forward flexion 0-180 degrees [] : no AC joint tenderness [Right] : right shoulder [There are no fractures, subluxations or dislocations. No significant abnormalities are seen] : There are no fractures, subluxations or dislocations. No significant abnormalities are seen [Type 3 acromion] : Type 3 acromion [FreeTextEntry9] : T10 on the left.  [de-identified] : +Haylie [TWNoteComboBox6] : internal rotation L1 [de-identified] : external rotation 80 degrees

## 2024-10-18 ENCOUNTER — APPOINTMENT (OUTPATIENT)
Dept: ORTHOPEDIC SURGERY | Facility: CLINIC | Age: 64
End: 2024-10-18
Payer: COMMERCIAL

## 2024-10-18 VITALS — BODY MASS INDEX: 28.44 KG/M2 | WEIGHT: 192 LBS | HEIGHT: 69 IN

## 2024-10-18 DIAGNOSIS — M22.41 CHONDROMALACIA PATELLAE, RIGHT KNEE: ICD-10-CM

## 2024-10-18 PROCEDURE — 99214 OFFICE O/P EST MOD 30 MIN: CPT | Mod: 25

## 2024-10-18 PROCEDURE — 73562 X-RAY EXAM OF KNEE 3: CPT | Mod: RT

## 2024-10-18 PROCEDURE — 20610 DRAIN/INJ JOINT/BURSA W/O US: CPT | Mod: RT

## 2024-10-18 PROCEDURE — A4550 SURGICAL TRAYS: CPT

## 2024-10-22 ENCOUNTER — APPOINTMENT (OUTPATIENT)
Dept: PAIN MANAGEMENT | Facility: CLINIC | Age: 64
End: 2024-10-22
Payer: COMMERCIAL

## 2024-10-22 VITALS — BODY MASS INDEX: 28.88 KG/M2 | HEIGHT: 69 IN | WEIGHT: 195 LBS

## 2024-10-22 PROCEDURE — 99214 OFFICE O/P EST MOD 30 MIN: CPT

## 2024-10-29 ENCOUNTER — APPOINTMENT (OUTPATIENT)
Dept: PAIN MANAGEMENT | Facility: CLINIC | Age: 64
End: 2024-10-29
Payer: COMMERCIAL

## 2024-10-29 DIAGNOSIS — M54.16 RADICULOPATHY, LUMBAR REGION: ICD-10-CM

## 2024-10-29 PROCEDURE — 64483 NJX AA&/STRD TFRM EPI L/S 1: CPT | Mod: LT

## 2024-10-29 PROCEDURE — 64484 NJX AA&/STRD TFRM EPI L/S EA: CPT | Mod: 59,LT

## 2024-10-29 PROCEDURE — A4550 SURGICAL TRAYS: CPT

## 2024-11-11 ENCOUNTER — APPOINTMENT (OUTPATIENT)
Dept: PAIN MANAGEMENT | Facility: CLINIC | Age: 64
End: 2024-11-11

## 2025-02-03 ENCOUNTER — APPOINTMENT (OUTPATIENT)
Dept: PAIN MANAGEMENT | Facility: CLINIC | Age: 65
End: 2025-02-03
Payer: COMMERCIAL

## 2025-02-03 VITALS — WEIGHT: 202 LBS | BODY MASS INDEX: 29.92 KG/M2 | HEIGHT: 69 IN

## 2025-02-03 DIAGNOSIS — M54.16 RADICULOPATHY, LUMBAR REGION: ICD-10-CM

## 2025-02-03 DIAGNOSIS — Z87.39 PERSONAL HISTORY OF OTHER DISEASES OF THE MUSCULOSKELETAL SYSTEM AND CONNECTIVE TISSUE: ICD-10-CM

## 2025-02-03 PROCEDURE — 20552 NJX 1/MLT TRIGGER POINT 1/2: CPT

## 2025-02-03 PROCEDURE — 99214 OFFICE O/P EST MOD 30 MIN: CPT | Mod: 25

## 2025-02-03 PROCEDURE — A4550 SURGICAL TRAYS: CPT

## 2025-02-03 PROCEDURE — J3490M: CUSTOM

## 2025-02-11 ENCOUNTER — APPOINTMENT (OUTPATIENT)
Dept: MRI IMAGING | Facility: CLINIC | Age: 65
End: 2025-02-11
Payer: COMMERCIAL

## 2025-02-11 PROCEDURE — 72148 MRI LUMBAR SPINE W/O DYE: CPT

## 2025-02-24 ENCOUNTER — APPOINTMENT (OUTPATIENT)
Dept: PAIN MANAGEMENT | Facility: CLINIC | Age: 65
End: 2025-02-24
Payer: COMMERCIAL

## 2025-02-24 VITALS — HEIGHT: 69 IN | BODY MASS INDEX: 30.07 KG/M2 | WEIGHT: 203 LBS

## 2025-02-24 DIAGNOSIS — M54.16 RADICULOPATHY, LUMBAR REGION: ICD-10-CM

## 2025-02-24 DIAGNOSIS — M79.18 MYALGIA, OTHER SITE: ICD-10-CM

## 2025-02-24 PROCEDURE — 99214 OFFICE O/P EST MOD 30 MIN: CPT

## 2025-03-31 ENCOUNTER — APPOINTMENT (OUTPATIENT)
Dept: PAIN MANAGEMENT | Facility: CLINIC | Age: 65
End: 2025-03-31
Payer: COMMERCIAL

## 2025-03-31 DIAGNOSIS — M54.16 RADICULOPATHY, LUMBAR REGION: ICD-10-CM

## 2025-03-31 PROCEDURE — 64483 NJX AA&/STRD TFRM EPI L/S 1: CPT | Mod: LT

## 2025-03-31 PROCEDURE — 64484 NJX AA&/STRD TFRM EPI L/S EA: CPT | Mod: LT,59

## 2025-04-14 ENCOUNTER — APPOINTMENT (OUTPATIENT)
Dept: PAIN MANAGEMENT | Facility: CLINIC | Age: 65
End: 2025-04-14